# Patient Record
Sex: MALE | Race: WHITE | NOT HISPANIC OR LATINO | Employment: FULL TIME | URBAN - METROPOLITAN AREA
[De-identification: names, ages, dates, MRNs, and addresses within clinical notes are randomized per-mention and may not be internally consistent; named-entity substitution may affect disease eponyms.]

---

## 2017-06-11 ENCOUNTER — OFFICE VISIT (OUTPATIENT)
Dept: URGENT CARE | Facility: CLINIC | Age: 57
End: 2017-06-11
Payer: COMMERCIAL

## 2017-06-11 VITALS
TEMPERATURE: 98.7 F | WEIGHT: 155 LBS | HEART RATE: 80 BPM | HEIGHT: 65 IN | DIASTOLIC BLOOD PRESSURE: 84 MMHG | RESPIRATION RATE: 14 BRPM | BODY MASS INDEX: 25.83 KG/M2 | SYSTOLIC BLOOD PRESSURE: 142 MMHG | OXYGEN SATURATION: 97 %

## 2017-06-11 DIAGNOSIS — K08.9 DENTAL DISORDER: ICD-10-CM

## 2017-06-11 PROCEDURE — 99203 OFFICE O/P NEW LOW 30 MIN: CPT | Performed by: PHYSICIAN ASSISTANT

## 2017-06-11 RX ORDER — CLINDAMYCIN HYDROCHLORIDE 300 MG/1
300 CAPSULE ORAL 3 TIMES DAILY
Qty: 21 CAP | Refills: 0 | Status: SHIPPED | OUTPATIENT
Start: 2017-06-11 | End: 2017-06-18

## 2017-06-11 RX ORDER — HYDROCODONE BITARTRATE AND ACETAMINOPHEN 5; 325 MG/1; MG/1
1-2 TABLET ORAL EVERY 4 HOURS PRN
COMMUNITY

## 2017-06-11 RX ORDER — SULFAMETHOXAZOLE AND TRIMETHOPRIM 800; 160 MG/1; MG/1
TABLET ORAL
Qty: 20 TAB | Refills: 0 | Status: SHIPPED | OUTPATIENT
Start: 2017-06-11 | End: 2017-06-11

## 2017-06-11 RX ORDER — IBUPROFEN 200 MG
200 TABLET ORAL EVERY 6 HOURS PRN
COMMUNITY

## 2017-06-11 RX ORDER — HYDROCODONE BITARTRATE AND ACETAMINOPHEN 5; 325 MG/1; MG/1
1 TABLET ORAL EVERY 6 HOURS PRN
Qty: 15 TAB | Refills: 0 | Status: SHIPPED | OUTPATIENT
Start: 2017-06-11

## 2017-06-11 ASSESSMENT — ENCOUNTER SYMPTOMS
CHILLS: 0
HEADACHES: 0
SORE THROAT: 0
FEVER: 0
COUGH: 0

## 2017-06-11 NOTE — PROGRESS NOTES
"Subjective:      Lazaro Abreu is a 56 y.o. male who presents with Oral Swelling            HPI Comments: Subjective: Patient is a 56-year-old with poor dentition who states that he has dental infection in the left upper first molar. He states his tooth break off frequently N and the need to be pulled. For about 3 days he's had significant dental pain and achiness in the left side of his mouth. He states the tooth is broken off. Denies fever, chills, nauseous vomiting or known cynthia abscess. States he will be following up with dentist this week    Past medical history: Is not pertinent to this examination  Past surgical history: Not pertinent to this examination  Family history: Is not pertinent to this examination  Allergies: No known drug allergies  Social history: Is reviewed in Epic      Review of Systems   Constitutional: Negative for fever and chills.   HENT: Negative for sore throat.    Respiratory: Negative for cough.    Skin: Negative for rash.   Neurological: Negative for headaches.          Objective:     /84 mmHg  Pulse 80  Temp(Src) 37.1 °C (98.7 °F)  Resp 14  Ht 1.651 m (5' 5\")  Wt 70.308 kg (155 lb)  BMI 25.79 kg/m2  SpO2 97%     Physical Exam       Gen.: Patient is A and O ×3, no acute distress, well-nourished well-hydrated  Vitals: Are listed and unremarkable  HEENT: Heads normocephalic, atraumatic, PERRLA, extraocular movements intact, TMs are clear. Oropharynx shows extremely poor dentition, multiple mode broken teeth, multiple caries. On the left upper side first molar is broken off almost completely to the root. There is no surrounding bogginess but it is tender to palpation. No abscess or cynthia masses palpable  Neck: Soft supple without cervical lymphadenopathy  Cardiovascular: Regular rate and rhythm normal S1 and S2. No murmurs, rubs or gallops  Lungs are clear to auscultation bilaterally. no wheezes rales or rhonchi  Abdomen is soft, nontender, nondistended with good bowel " sounds, no hepatosplenomegaly  Skin: Is well perfused without evidence of rash or lesions  Neurological:  cranial nerves II through XII were assessed and intact.  Musculoskeletal: Full range of motion, 5 out of 5 strength against resistance  Neurovascularly: Intact with a 2 second cap refill, good distal pulses       Assessment/Plan:     1. Dental disorder  Discussed the importance of following up with dentist. Warm salt gargles, 3 times daily. We'll prescribe clindamycin is allergic to penicillin. Did discuss that symptoms will likely persist if he does not get his tooth pulled. He states he understands and will follow up as needed. Take ibuprofen 600 mg 3 times a day with food and only take the Norco only as needed for breakthrough pain    - clindamycin (CLEOCIN) 300 MG Cap; Take 1 Cap by mouth 3 times a day for 7 days.  Dispense: 21 Cap; Refill: 0  - hydrocodone-acetaminophen (NORCO) 5-325 MG Tab per tablet; Take 1 Tab by mouth every 6 hours as needed.  Dispense: 15 Tab; Refill: 0

## 2017-06-11 NOTE — MR AVS SNAPSHOT
"        Lazaro Abreu   2017 9:15 AM   Office Visit   MRN: [id number]    Department:  [facility]   Dept Phone:  [phone]    Description:  Male : 1960   Provider:  Lisbeth Goins PA-C           Reason for Visit     Oral Swelling l upper tooth pain x 2 days .      Allergies as of 2017     Allergen Noted Reactions    Penicillins 2009         You were diagnosed with     Dental disorder   [[id number]]         Vital Signs     Blood Pressure Pulse Temperature Respirations Height Weight    142/84 mmHg 80 37.1 °C (98.7 °F) 14 1.651 m (5' 5\") 70.308 kg (155 lb)    Body Mass Index Oxygen Saturation                25.79 kg/m2 97%          Basic Information     Date Of Birth Sex Race Ethnicity Preferred Language    1960 Male White Non- English      Health Maintenance        Date Due Completion Dates    IMM DTaP/Tdap/Td Vaccine (1 - Tdap) 1977 ---    COLONOSCOPY 2008 ---            Current Immunizations     No immunizations on file.      Below and/or attached are the medications your provider expects you to take. Review all of your home medications and newly ordered medications with your provider and/or pharmacist. Follow medication instructions as directed by your provider and/or pharmacist. Please keep your medication list with you and share with your provider. Update the information when medications are discontinued, doses are changed, or new medications (including over-the-counter products) are added; and carry medication information at all times in the event of emergency situations     Allergies:  PENICILLINS - (reactions not documented)               Medications  Valid as of: 2017 - 10:43 AM    Generic Name Brand Name Tablet Size Instructions for use    Clindamycin HCl (Cap) CLEOCIN 300 MG Take 1 Cap by mouth 3 times a day for 7 days.        Hydrocodone-Acetaminophen (Tab) NORCO 5-325 MG Take 1-2 Tabs by mouth every four hours as needed.       " Hydrocodone-Acetaminophen (Tab) NORCO 5-325 MG Take 1 Tab by mouth every 6 hours as needed.        Ibuprofen (Tab) MOTRIN 200 MG Take 200 mg by mouth every 6 hours as needed.        .                 Medicines prescribed today were sent to:     [id number]  [place], [place] - [address]    [address] [place] [address]    Phone: [phone] Fax: [phone]    Open 24 Hours?: Yes      Medication refill instructions:       If your prescription bottle indicates you have medication refills left, it is not necessary to call your provider’s office. Please contact your pharmacy and they will refill your medication.    If your prescription bottle indicates you do not have any refills left, you may request refills at any time through one of the following ways: The online "[employer] system (except Urgent Care), by calling your provider’s office, or by asking your pharmacy to contact your provider’s office with a refill request. Medication refills are processed only during regular business hours and may not be available until the next business day. Your provider may request additional information or to have a follow-up visit with you prior to refilling your medication.   *Please Note: Medication refills are assigned a new Rx number when refilled electronically. Your pharmacy may indicate that no refills were authorized even though a new prescription for the same medication is available at the pharmacy. Please request the medicine by name with the pharmacy before contacting your provider for a refill.           "[employer] Access Code: Activation code not generated  Current "[employer] Status: Active

## 2017-09-29 ENCOUNTER — OFFICE VISIT (OUTPATIENT)
Dept: URGENT CARE | Facility: CLINIC | Age: 57
End: 2017-09-29
Payer: COMMERCIAL

## 2017-09-29 VITALS
WEIGHT: 168 LBS | RESPIRATION RATE: 14 BRPM | DIASTOLIC BLOOD PRESSURE: 86 MMHG | BODY MASS INDEX: 27.99 KG/M2 | HEART RATE: 90 BPM | TEMPERATURE: 98.4 F | SYSTOLIC BLOOD PRESSURE: 134 MMHG | HEIGHT: 65 IN | OXYGEN SATURATION: 97 %

## 2017-09-29 DIAGNOSIS — S56.494A: ICD-10-CM

## 2017-09-29 PROCEDURE — 99214 OFFICE O/P EST MOD 30 MIN: CPT | Performed by: EMERGENCY MEDICINE

## 2017-09-29 RX ORDER — CYCLOBENZAPRINE HCL 5 MG
5-10 TABLET ORAL 3 TIMES DAILY PRN
Qty: 20 TAB | Refills: 0 | Status: SHIPPED | OUTPATIENT
Start: 2017-09-29

## 2017-09-29 RX ORDER — HYDROCODONE BITARTRATE AND ACETAMINOPHEN 7.5; 325 MG/1; MG/1
1 TABLET ORAL EVERY 6 HOURS PRN
Qty: 15 TAB | Refills: 0 | Status: SHIPPED | OUTPATIENT
Start: 2017-09-29

## 2017-09-29 ASSESSMENT — ENCOUNTER SYMPTOMS
SENSORY CHANGE: 0
FEVER: 0
NECK PAIN: 0
NUMBNESS: 0
SHORTNESS OF BREATH: 0
TINGLING: 0
MUSCLE WEAKNESS: 1
FOCAL WEAKNESS: 1
TREMORS: 1

## 2017-09-29 NOTE — PROGRESS NOTES
Subjective:      Lazaro Abreu is a 57 y.o. male who presents with Arm Pain (R arm pain x2wks)            Arm Pain    Incident onset: over 2 weeks ago. The injury mechanism was repetitive motion. The pain is present in the right forearm, right elbow and right fingers. The quality of the pain is described as aching. The pain does not radiate. The pain is severe. Associated symptoms include muscle weakness. Pertinent negatives include no chest pain, numbness or tingling. The symptoms are aggravated by movement. He has tried heat and NSAIDs for the symptoms. The treatment provided no relief.   Patient notes onset after splitting wood; no specific known trauma. Saw PCP who performed neck x-rays, advised NSAID's; when not improving recommended MRI.    Review of Systems   Constitutional: Negative for fever and malaise/fatigue.   Respiratory: Negative for shortness of breath.    Cardiovascular: Negative for chest pain.   Musculoskeletal: Negative for neck pain.   Skin: Negative for rash.   Neurological: Positive for tremors and focal weakness. Negative for tingling, sensory change and numbness.     PMH:  has a past medical history of Single kidney.  MEDS:   Current Outpatient Prescriptions:   •  cyclobenzaprine (FLEXERIL) 5 MG tablet, Take 1-2 Tabs by mouth 3 times a day as needed for Moderate Pain or Muscle Spasms., Disp: 20 Tab, Rfl: 0  •  hydrocodone-acetaminophen (NORCO) 7.5-325 MG per tablet, Take 1 Tab by mouth every 6 hours as needed for Severe Pain., Disp: 15 Tab, Rfl: 0  •  ibuprofen (MOTRIN) 200 MG Tab, Take 200 mg by mouth every 6 hours as needed., Disp: , Rfl:   •  hydrocodone-acetaminophen (NORCO) 5-325 MG Tab per tablet, Take 1-2 Tabs by mouth every four hours as needed., Disp: , Rfl:   •  hydrocodone-acetaminophen (NORCO) 5-325 MG Tab per tablet, Take 1 Tab by mouth every 6 hours as needed. (Patient not taking: Reported on 9/29/2017), Disp: 15 Tab, Rfl: 0  ALLERGIES:   Allergies   Allergen Reactions   •  "Penicillins      SURGHX:   Past Surgical History:   Procedure Laterality Date   • KIDNEY HARVEST     • TONSILLECTOMY     • VASECTOMY       SOCHX:  reports that he has quit smoking. He does not have any smokeless tobacco history on file. He reports that he does not drink alcohol or use drugs.  FH: family history includes Heart Disease in his father.       Objective:     /86   Pulse 90   Temp 36.9 °C (98.4 °F)   Resp 14   Ht 1.651 m (5' 5\")   Wt 76.2 kg (168 lb)   SpO2 97%   BMI 27.96 kg/m²      Physical Exam   Constitutional: He appears well-developed and well-nourished. He is cooperative. He does not have a sickly appearance. He does not appear ill.   Neck: Full passive range of motion without pain and phonation normal. Neck supple. No spinous process tenderness and no muscular tenderness present.   Negative Spurling's test.   Cardiovascular: Normal rate, regular rhythm and normal heart sounds.    Pulses:       Radial pulses are 2+ on the right side.   Pulmonary/Chest: Effort normal and breath sounds normal.   Musculoskeletal:        Right forearm: He exhibits tenderness. He exhibits no bony tenderness and no edema.        Arms:       Right hand: He exhibits decreased range of motion. He exhibits no tenderness, no bony tenderness and no swelling.        Hands:  Neurological: He is alert. He displays tremor. He displays no atrophy.   Distal sensation to light touch and pressure intact.   Skin: Skin is warm, dry and intact. No rash noted.   Psychiatric: He has a normal mood and affect.               Assessment/Plan:     1. Inj extensor musc/fasc/tend l mid finger at forarm lv, init  Rest, ice/heat, OTC NSAID  - cyclobenzaprine (FLEXERIL) 5 MG tablet; Take 1-2 Tabs by mouth 3 times a day as needed for Moderate Pain or Muscle Spasms.  Dispense: 20 Tab; Refill: 0  - hydrocodone-acetaminophen (NORCO) 7.5-325 MG per tablet; Take 1 Tab by mouth every 6 hours as needed for Severe Pain.  Dispense: 15 Tab; Refill: " 0  - REFERRAL TO HAND SURGERY